# Patient Record
Sex: FEMALE | Race: WHITE | NOT HISPANIC OR LATINO
[De-identification: names, ages, dates, MRNs, and addresses within clinical notes are randomized per-mention and may not be internally consistent; named-entity substitution may affect disease eponyms.]

---

## 2022-04-13 ENCOUNTER — NON-APPOINTMENT (OUTPATIENT)
Age: 57
End: 2022-04-13

## 2022-04-13 DIAGNOSIS — K57.92 DIVERTICULITIS OF INTESTINE, PART UNSPECIFIED, W/OUT PERFORATION OR ABSCESS W/OUT BLEEDING: ICD-10-CM

## 2022-04-13 DIAGNOSIS — Z87.891 PERSONAL HISTORY OF NICOTINE DEPENDENCE: ICD-10-CM

## 2022-04-13 DIAGNOSIS — Z83.3 FAMILY HISTORY OF DIABETES MELLITUS: ICD-10-CM

## 2022-04-13 DIAGNOSIS — Z92.89 PERSONAL HISTORY OF OTHER MEDICAL TREATMENT: ICD-10-CM

## 2022-04-13 DIAGNOSIS — Z98.890 OTHER SPECIFIED POSTPROCEDURAL STATES: ICD-10-CM

## 2022-04-13 DIAGNOSIS — Z78.9 OTHER SPECIFIED HEALTH STATUS: ICD-10-CM

## 2022-04-13 PROBLEM — Z00.00 ENCOUNTER FOR PREVENTIVE HEALTH EXAMINATION: Status: ACTIVE | Noted: 2022-04-13

## 2022-06-21 ENCOUNTER — APPOINTMENT (OUTPATIENT)
Dept: OBGYN | Facility: CLINIC | Age: 57
End: 2022-06-21
Payer: COMMERCIAL

## 2022-06-21 VITALS
HEIGHT: 65 IN | WEIGHT: 165 LBS | DIASTOLIC BLOOD PRESSURE: 83 MMHG | HEART RATE: 70 BPM | BODY MASS INDEX: 27.49 KG/M2 | SYSTOLIC BLOOD PRESSURE: 143 MMHG

## 2022-06-21 DIAGNOSIS — N75.0 CYST OF BARTHOLIN'S GLAND: ICD-10-CM

## 2022-06-21 PROCEDURE — 99213 OFFICE O/P EST LOW 20 MIN: CPT

## 2022-06-21 RX ORDER — METRONIDAZOLE 500 MG/1
500 TABLET ORAL TWICE DAILY
Qty: 20 | Refills: 0 | Status: ACTIVE | COMMUNITY
Start: 2022-06-21 | End: 1900-01-01

## 2022-06-21 RX ORDER — CEPHALEXIN 500 MG/1
500 CAPSULE ORAL 4 TIMES DAILY
Qty: 40 | Refills: 0 | Status: ACTIVE | COMMUNITY
Start: 2022-06-21 | End: 1900-01-01

## 2022-06-21 NOTE — HISTORY OF PRESENT ILLNESS
[FreeTextEntry1] : Patient is a 57-year-old  0 last menstrual period 7 years ago\par Patient presents complaining of cyst lump on the left side of the vagina over the past few days

## 2022-06-21 NOTE — PLAN
[FreeTextEntry1] : Patient is a 57-year-old  0 last menstrual period 7 years ago presents complaining of left lump cystic area over the past few days\par Patient also complaining of a small cyst in the upper midportion of the vulva near clitoris\par Pelvic exam shows normal female external genitalia small folliculitis area the above the clitoris on the very portion of the skin,, left Bartholin cyst approximately 4 to 5 cm nontender no erythema\par Findings discussed with the patient\par Will start treatment with Keflex and Flagyl and follow-up in 2 weeks\par Possibility of not responding and possibly becoming worse,,, with requiring surgical intervention discussed\par Patient patient  she understands

## 2022-06-21 NOTE — PHYSICAL EXAM
[Labia Majora] : normal [Labia Minora] : normal [Normal] : normal [FreeTextEntry1] : Left Bartholin cyst approximately 4 cm,,, nontender,, no erythema

## 2022-07-07 ENCOUNTER — APPOINTMENT (OUTPATIENT)
Dept: OBGYN | Facility: CLINIC | Age: 57
End: 2022-07-07

## 2022-07-07 VITALS
HEART RATE: 84 BPM | BODY MASS INDEX: 27.49 KG/M2 | SYSTOLIC BLOOD PRESSURE: 154 MMHG | HEIGHT: 65 IN | DIASTOLIC BLOOD PRESSURE: 99 MMHG | WEIGHT: 165 LBS

## 2022-07-07 PROCEDURE — 99213 OFFICE O/P EST LOW 20 MIN: CPT

## 2022-07-07 NOTE — HISTORY OF PRESENT ILLNESS
[FreeTextEntry1] : Patient is a 57-year-old  2 para 0-0-2-0 last menstrual period approximately 7 years ago\par Patient presents for follow-up\par Patient was recently seen and diagnosed of left Bartholin cyst,,, patient received a course of antibiotics

## 2022-07-07 NOTE — PLAN
[FreeTextEntry1] : Patient is a 57-year-old  2 para 0-0-2-0 last menstrual period approximately 7 years ago\par Patient was seen recently and diagnosed with a left Bartholin cyst approximately 4 to 5 cm and will start her symptomatic treatment.  For 10 days\par Patient presents today for follow-up\par Pelvic exam shows normal female external genitalia evidence of a left breast.  Pressure approximately 2 cm, nontender, no erythema\par Patient has responded well to the antibiotics\par At this point patient does not require surgical intervention\par Findings discussed with patient\par Patient reassured

## 2022-07-07 NOTE — PHYSICAL EXAM
[Chaperone Present] : A chaperone was present in the examining room during all aspects of the physical examination [Labia Majora] : normal [Labia Minora] : normal [Normal] : normal [FreeTextEntry4] : Left Bartholin cyst approximately 2 cm,,, nontender,,, no erythema

## 2023-01-17 ENCOUNTER — APPOINTMENT (OUTPATIENT)
Dept: OBGYN | Facility: CLINIC | Age: 58
End: 2023-01-17
Payer: COMMERCIAL

## 2023-01-17 VITALS
HEIGHT: 65 IN | HEART RATE: 71 BPM | BODY MASS INDEX: 29.49 KG/M2 | WEIGHT: 177 LBS | DIASTOLIC BLOOD PRESSURE: 93 MMHG | SYSTOLIC BLOOD PRESSURE: 166 MMHG

## 2023-01-17 DIAGNOSIS — N39.0 URINARY TRACT INFECTION, SITE NOT SPECIFIED: ICD-10-CM

## 2023-01-17 PROCEDURE — 99213 OFFICE O/P EST LOW 20 MIN: CPT

## 2023-01-17 RX ORDER — CIPROFLOXACIN HYDROCHLORIDE 250 MG/1
250 TABLET, FILM COATED ORAL
Qty: 14 | Refills: 0 | Status: ACTIVE | COMMUNITY
Start: 2023-01-17 | End: 1900-01-01

## 2023-01-17 RX ORDER — PHENAZOPYRIDINE HYDROCHLORIDE 200 MG/1
200 TABLET ORAL 3 TIMES DAILY
Qty: 6 | Refills: 0 | Status: ACTIVE | COMMUNITY
Start: 2023-01-17 | End: 1900-01-01

## 2023-01-17 NOTE — HISTORY OF PRESENT ILLNESS
[FreeTextEntry1] : Patient is a 57-year-old  2 para 0-0-2-0 last menstrual period approximately 7 years prior\par Patient was for follow-up after being treated for left Bartholin cyst with antibiotics\par Patient was complaining of some urinary symptoms

## 2023-01-17 NOTE — PHYSICAL EXAM
[Chaperone Present] : A chaperone was present in the examining room during all aspects of the physical examination [Labia Majora] : normal [Labia Minora] : normal [Normal] : normal [FreeTextEntry4] : Resolution of the left Bartholin cyst

## 2023-01-17 NOTE — PLAN
[FreeTextEntry1] : Patient is a 57-year-old  2 para 0-0-2-0 last menstrual period approximately 7 years prior\par Patient presents for follow-up evaluation of her left Bartholin cyst who had been treated with antibiotics\par Patient was complaining of some urinary symptoms including odor, burning urination, increased frequency\par Pelvic exam shows normal female external genitalia evidence of normal-appearing vaginal introitus and vaginal vault,, no evidence of any Bartholin cyst inflammatory process,, thickening,, tenderness,,, erythema\par Patient has responded well to the antibiotics\par Urine sample sent for urine culture\par Patient's symptoms consistent with a UTI\par Will start empiric therapy with Cipro and Pyridium

## 2023-01-18 LAB
APPEARANCE: CLEAR
BILIRUBIN URINE: NEGATIVE
BLOOD URINE: ABNORMAL
COLOR: NORMAL
GLUCOSE QUALITATIVE U: NEGATIVE
KETONES URINE: NEGATIVE
LEUKOCYTE ESTERASE URINE: NEGATIVE
NITRITE URINE: NEGATIVE
PH URINE: 6
PROTEIN URINE: NEGATIVE
SPECIFIC GRAVITY URINE: 1.01
UROBILINOGEN URINE: NORMAL

## 2023-01-23 LAB — BACTERIA UR CULT: ABNORMAL
